# Patient Record
Sex: FEMALE | Race: WHITE
[De-identification: names, ages, dates, MRNs, and addresses within clinical notes are randomized per-mention and may not be internally consistent; named-entity substitution may affect disease eponyms.]

---

## 2017-10-16 ENCOUNTER — HOSPITAL ENCOUNTER (OUTPATIENT)
Dept: HOSPITAL 45 - C.RDSM | Age: 72
Discharge: HOME | End: 2017-10-16
Attending: ORTHOPAEDIC SURGERY
Payer: COMMERCIAL

## 2017-10-16 DIAGNOSIS — Z96.651: Primary | ICD-10-CM

## 2018-05-17 ENCOUNTER — HOSPITAL ENCOUNTER (OUTPATIENT)
Dept: HOSPITAL 45 - X.SURG | Age: 73
Discharge: HOME | End: 2018-05-17
Attending: PHYSICAL MEDICINE & REHABILITATION
Payer: COMMERCIAL

## 2018-05-17 VITALS — TEMPERATURE: 98.6 F

## 2018-05-17 VITALS — HEART RATE: 80 BPM | SYSTOLIC BLOOD PRESSURE: 122 MMHG | DIASTOLIC BLOOD PRESSURE: 77 MMHG | OXYGEN SATURATION: 93 %

## 2018-05-17 VITALS
HEIGHT: 60.51 IN | WEIGHT: 195.42 LBS | BODY MASS INDEX: 37.38 KG/M2 | WEIGHT: 195.42 LBS | BODY MASS INDEX: 37.38 KG/M2 | HEIGHT: 60.51 IN

## 2018-05-17 DIAGNOSIS — M46.1: Primary | ICD-10-CM

## 2018-05-17 DIAGNOSIS — Z98.890: ICD-10-CM

## 2018-05-17 DIAGNOSIS — Z79.899: ICD-10-CM

## 2018-05-17 DIAGNOSIS — M96.1: ICD-10-CM

## 2018-05-17 DIAGNOSIS — M43.26: ICD-10-CM

## 2018-05-17 NOTE — DISCHARGE INSTRUCTIONS
Discharge Instructions


Date of Service


May 17, 2018.





Visit


Reason for Visit:  Post Laminectomy Syndrome, Sacroiliitis





Discharge


Discharge Diagnosis / Problem:  low back pain





Discharge Goals


Goal(s):  Decrease discomfort, Improve function





Activity Recommendations


Activity Limitations:  resume your previous activity





Anesthesia


.





Post Anesthesia Instructions:





If you have had General Anesthesia or IV Sedation:





*  Do not drive today.


*  Resume driving when surgeon permits.


*  Do not make important decisions or sign legal documents today.


*  Call surgeon for:





   1.  Temperature elevations greater than 101 degrees F.


   2.  Uncontrollable pain.


   3.  Excessive bleeding.


   4.  Persistent nausea and vomiting.


   5.  Medication intolerance (nausea, vomiting or rash).





*  For nausea and vomiting use only clear liquids such as: tea, soda, bouillon 

until nausea subsides, then gradually increase diet as tolerated.





*  If you have any concerns or questions, call your surgeon's office.  If 

physician is unavailable and it is an emergency, call 911 or go to the nearest 

emergency room.





.





Diet Recommendations


Recommended Home Diet:  resume previous diet





Procedures


Procedures Performed:  


Right Sacroiliac Joint Injection





Pending Studies


Studies pending at discharge:  no





Medical Emergencies








.


Who to Call and When:





Medical Emergencies:  If at any time you feel your situation is an emergency, 

please call 911 immediately.





.





Non-Emergent Contact


Non-Emergency issues call your:  Specialist





.


.








"Provider Documentation" section prepared by Antonio Linder.








.

## 2018-05-17 NOTE — OPERATIVE REPORT
DATE OF OPERATION:  05/17/2018

 

PREOPERATIVE DIAGNOSIS:  Right sacroiliitis, underlying lumbar spinal fusion.

 

POSTOPERATIVE DIAGNOSIS:  Right sacroiliitis, underlying lumbar spinal

fusion.

 

PROCEDURE:  Right sacroiliac joint injection under fluoroscopic guidance.

 

INDICATIONS:  The patient is a 72-year-old white female who had followup,

presented with increasing pain.  She noted that the right sacroiliac joint

more uncomfortable with twisting, getting out of a chair, turning.  She has a

prior lumbar fusion surgery done, which places increased stress upon the

sacroiliac joints.

 

PHYSICAL EXAMINATION:  Pleasant female, seated comfortably.  She has point

tenderness to palpation of her right SI joint, positive sacral compression

maneuver.  No focal weakness.  Negative sacral distraction maneuver with

reproduction of pain moving from flexion to an extended position.

 

CONSENT:  Verbal and written consent was obtained from the patient.  Risks

and benefits were reviewed.  Risks include, but are not limited to abscess

and allergic reaction.  The patient wishes to proceed.

 

DESCRIPTION OF PROCEDURE:  The patient was taken back to the special

procedures room of the [facility] where he was maintained

in a prone position.  Backside was cleansed with Betadine x3 and a dry

sterile dressing was applied.  Fluoroscope was used to identify the right

sacroiliac joint.  The overlying skin was anesthetized with 2.5 mL of

lidocaine 1% with a 25-gauge 1.5-inch needle.  A 25-gauge 3.5-inch spinal

needle was then directed into the joint.  Isovue-300 contrast 0.25 mL was

injected, which demonstrated an intra-articular pattern.  She then underwent

injection after negative aspiration of 40 mg Depo-Medrol and 1.5 mL of

bupivacaine 0.25%.  Injection was well tolerated.

 

DISPOSITION:  The patient was taken out into the discharge recovery area

where she will follow up in 4 weeks' time at the [facility]

office.

 

 

I attest to the content of the Intraoperative Record and any orders documented therein. Any exception
s are noted below.

## 2018-05-17 NOTE — [facility] POST OPERATIVE BRIEF NOTE
Immediate Operative Summary


Operative Date


May 17, 2018.





Pre-Operative Diagnosis





Right sacroiliitis, underlying lumbar fusion





Post-Operative Diagnosis





Same





Procedure(s) Performed





Right Sacroiliac Joint Injection





Surgeon


Dr. KANIKA Linder





Assistant Surgeon(s)


None





Estimated Blood Loss


0





Findings


Consistent with Post-Op Diagnosis





Specimens





NA





Anesthesia Type


Local





Complication(s)


none





Disposition


Disposition: